# Patient Record
Sex: FEMALE | Race: OTHER | NOT HISPANIC OR LATINO
[De-identification: names, ages, dates, MRNs, and addresses within clinical notes are randomized per-mention and may not be internally consistent; named-entity substitution may affect disease eponyms.]

---

## 2018-11-14 ENCOUNTER — RESULT REVIEW (OUTPATIENT)
Age: 68
End: 2018-11-14

## 2019-04-08 PROBLEM — Z00.00 ENCOUNTER FOR PREVENTIVE HEALTH EXAMINATION: Status: ACTIVE | Noted: 2019-04-08

## 2019-04-17 ENCOUNTER — APPOINTMENT (OUTPATIENT)
Dept: OBGYN | Facility: CLINIC | Age: 69
End: 2019-04-17
Payer: MEDICARE

## 2019-04-17 VITALS
DIASTOLIC BLOOD PRESSURE: 70 MMHG | HEIGHT: 61 IN | WEIGHT: 138 LBS | SYSTOLIC BLOOD PRESSURE: 130 MMHG | BODY MASS INDEX: 26.06 KG/M2

## 2019-04-17 DIAGNOSIS — M54.16 RADICULOPATHY, LUMBAR REGION: ICD-10-CM

## 2019-04-17 DIAGNOSIS — D25.9 LEIOMYOMA OF UTERUS, UNSPECIFIED: ICD-10-CM

## 2019-04-17 DIAGNOSIS — K52.9 NONINFECTIVE GASTROENTERITIS AND COLITIS, UNSPECIFIED: ICD-10-CM

## 2019-04-17 DIAGNOSIS — M79.7 FIBROMYALGIA: ICD-10-CM

## 2019-04-17 DIAGNOSIS — R32 UNSPECIFIED URINARY INCONTINENCE: ICD-10-CM

## 2019-04-17 DIAGNOSIS — N83.209 UNSPECIFIED OVARIAN CYST, UNSPECIFIED SIDE: ICD-10-CM

## 2019-04-17 DIAGNOSIS — K21.9 GASTRO-ESOPHAGEAL REFLUX DISEASE W/OUT ESOPHAGITIS: ICD-10-CM

## 2019-04-17 PROCEDURE — 99205 OFFICE O/P NEW HI 60 MIN: CPT

## 2019-04-17 RX ORDER — PRAVASTATIN SODIUM 20 MG/1
20 TABLET ORAL
Refills: 0 | Status: ACTIVE | COMMUNITY

## 2019-04-17 RX ORDER — OMEPRAZOLE 100 %
POWDER (GRAM) MISCELLANEOUS
Refills: 0 | Status: ACTIVE | COMMUNITY

## 2019-04-17 RX ORDER — METFORMIN HYDROCHLORIDE 500 MG/1
500 TABLET, COATED ORAL
Refills: 0 | Status: ACTIVE | COMMUNITY

## 2019-04-17 NOTE — PHYSICAL EXAM
[Awake] : awake [Alert] : alert [Acute Distress] : no acute distress [LAD] : no lymphadenopathy [Thyroid Nodule] : no thyroid nodule [Goiter] : no goiter [Mass] : no breast mass [Nipple Discharge] : no nipple discharge [Axillary LAD] : no axillary lymphadenopathy [Tender] : non tender [Soft] : soft [Distended] : not distended [Oriented x3] : oriented to person, place, and time [H/Smegaly] : no hepatosplenomegaly [Depressed Mood] : not depressed [Flat Affect] : affect not flat [Normal] : uterus [No Bleeding] : there was no active vaginal bleeding [RRR, No Murmurs] : RRR, no murmurs [Uterine Adnexae] : were not tender and not enlarged [CTAB] : CTAB

## 2019-04-17 NOTE — COUNSELING
[Breast Self Exam] : breast self exam [Nutrition] : nutrition [Vitamins/Supplements] : vitamins/supplements [Exercise] : exercise [STD (testing, results, tx)] : STD (testing, results, tx) [Pre/Post Op Instructions] : pre/post op instructions [Weight Management] : weight management

## 2019-05-15 ENCOUNTER — APPOINTMENT (OUTPATIENT)
Dept: OBGYN | Facility: CLINIC | Age: 69
End: 2019-05-15

## 2019-06-13 ENCOUNTER — OUTPATIENT (OUTPATIENT)
Dept: OUTPATIENT SERVICES | Facility: HOSPITAL | Age: 69
LOS: 1 days | End: 2019-06-13
Payer: MEDICARE

## 2019-06-13 VITALS
WEIGHT: 136.03 LBS | RESPIRATION RATE: 16 BRPM | HEIGHT: 61 IN | OXYGEN SATURATION: 98 % | SYSTOLIC BLOOD PRESSURE: 144 MMHG | HEART RATE: 75 BPM | DIASTOLIC BLOOD PRESSURE: 75 MMHG | TEMPERATURE: 97 F

## 2019-06-13 DIAGNOSIS — N60.09 SOLITARY CYST OF UNSPECIFIED BREAST: Chronic | ICD-10-CM

## 2019-06-13 DIAGNOSIS — Z87.19 PERSONAL HISTORY OF OTHER DISEASES OF THE DIGESTIVE SYSTEM: ICD-10-CM

## 2019-06-13 DIAGNOSIS — N83.299 OTHER OVARIAN CYST, UNSPECIFIED SIDE: ICD-10-CM

## 2019-06-13 DIAGNOSIS — N83.209 UNSPECIFIED OVARIAN CYST, UNSPECIFIED SIDE: ICD-10-CM

## 2019-06-13 DIAGNOSIS — Z98.51 TUBAL LIGATION STATUS: Chronic | ICD-10-CM

## 2019-06-13 DIAGNOSIS — Z01.818 ENCOUNTER FOR OTHER PREPROCEDURAL EXAMINATION: ICD-10-CM

## 2019-06-13 LAB — BLD GP AB SCN SERPL QL: SIGNIFICANT CHANGE UP

## 2019-06-13 PROCEDURE — 86850 RBC ANTIBODY SCREEN: CPT

## 2019-06-13 PROCEDURE — G0463: CPT

## 2019-06-13 PROCEDURE — 86901 BLOOD TYPING SEROLOGIC RH(D): CPT

## 2019-06-13 PROCEDURE — 86900 BLOOD TYPING SEROLOGIC ABO: CPT

## 2019-06-13 PROCEDURE — 36415 COLL VENOUS BLD VENIPUNCTURE: CPT

## 2019-06-13 NOTE — H&P PST ADULT - REASON FOR ADMISSION
I have 1 cyst in the left ovary. That was an incidental finding in an MRI not related to my ovary, as per patient.  Patient states she has occasional pain in the left abdomen

## 2019-06-13 NOTE — H&P PST ADULT - NSANTHOSAYNRD_GEN_A_CORE
No. LORY screening performed.  STOP BANG Legend: 0-2 = LOW Risk; 3-4 = INTERMEDIATE Risk; 5-8 = HIGH Risk

## 2019-06-13 NOTE — H&P PST ADULT - HISTORY OF PRESENT ILLNESS
67 yo female with history of DM, HLD, Vitamin D deficiency, GERD, reports the above. Patient denies vaginal bleeding

## 2019-06-13 NOTE — H&P PST ADULT - NSICDXPROBLEM_GEN_ALL_CORE_FT
PROBLEM DIAGNOSES  Problem: Unspecified ovarian cyst, unspecified side  Assessment and Plan: Laparoscopic Left Oophorectomy    Problem: H/O gastroesophageal reflux (GERD)  Assessment and Plan: Advised to take medication daily, including the day of the procedure

## 2019-07-01 ENCOUNTER — TRANSCRIPTION ENCOUNTER (OUTPATIENT)
Age: 69
End: 2019-07-01

## 2019-07-02 ENCOUNTER — OUTPATIENT (OUTPATIENT)
Dept: OUTPATIENT SERVICES | Facility: HOSPITAL | Age: 69
LOS: 1 days | End: 2019-07-02
Payer: MEDICARE

## 2019-07-02 ENCOUNTER — RESULT REVIEW (OUTPATIENT)
Age: 69
End: 2019-07-02

## 2019-07-02 VITALS
RESPIRATION RATE: 14 BRPM | TEMPERATURE: 98 F | HEART RATE: 79 BPM | WEIGHT: 136.03 LBS | OXYGEN SATURATION: 97 % | SYSTOLIC BLOOD PRESSURE: 132 MMHG | DIASTOLIC BLOOD PRESSURE: 67 MMHG | HEIGHT: 61 IN

## 2019-07-02 VITALS
DIASTOLIC BLOOD PRESSURE: 68 MMHG | RESPIRATION RATE: 14 BRPM | TEMPERATURE: 98 F | SYSTOLIC BLOOD PRESSURE: 126 MMHG | OXYGEN SATURATION: 97 % | HEART RATE: 63 BPM

## 2019-07-02 DIAGNOSIS — N60.09 SOLITARY CYST OF UNSPECIFIED BREAST: Chronic | ICD-10-CM

## 2019-07-02 DIAGNOSIS — Z98.51 TUBAL LIGATION STATUS: Chronic | ICD-10-CM

## 2019-07-02 DIAGNOSIS — Z01.818 ENCOUNTER FOR OTHER PREPROCEDURAL EXAMINATION: ICD-10-CM

## 2019-07-02 DIAGNOSIS — N83.209 UNSPECIFIED OVARIAN CYST, UNSPECIFIED SIDE: ICD-10-CM

## 2019-07-02 LAB — BLD GP AB SCN SERPL QL: SIGNIFICANT CHANGE UP

## 2019-07-02 PROCEDURE — 88307 TISSUE EXAM BY PATHOLOGIST: CPT

## 2019-07-02 PROCEDURE — 58661 LAPAROSCOPY REMOVE ADNEXA: CPT

## 2019-07-02 PROCEDURE — 86900 BLOOD TYPING SEROLOGIC ABO: CPT

## 2019-07-02 PROCEDURE — 86850 RBC ANTIBODY SCREEN: CPT

## 2019-07-02 PROCEDURE — 58661 LAPAROSCOPY REMOVE ADNEXA: CPT | Mod: AS

## 2019-07-02 PROCEDURE — 82962 GLUCOSE BLOOD TEST: CPT

## 2019-07-02 PROCEDURE — 36415 COLL VENOUS BLD VENIPUNCTURE: CPT

## 2019-07-02 PROCEDURE — 86901 BLOOD TYPING SEROLOGIC RH(D): CPT

## 2019-07-02 RX ORDER — ACETAMINOPHEN 500 MG
2 TABLET ORAL
Qty: 40 | Refills: 0
Start: 2019-07-02 | End: 2019-07-06

## 2019-07-02 RX ORDER — SODIUM CHLORIDE 9 MG/ML
1000 INJECTION, SOLUTION INTRAVENOUS
Refills: 0 | Status: DISCONTINUED | OUTPATIENT
Start: 2019-07-02 | End: 2019-07-02

## 2019-07-02 RX ORDER — HYDROMORPHONE HYDROCHLORIDE 2 MG/ML
0.5 INJECTION INTRAMUSCULAR; INTRAVENOUS; SUBCUTANEOUS
Refills: 0 | Status: DISCONTINUED | OUTPATIENT
Start: 2019-07-02 | End: 2019-07-02

## 2019-07-02 RX ORDER — ONDANSETRON 8 MG/1
4 TABLET, FILM COATED ORAL ONCE
Refills: 0 | Status: DISCONTINUED | OUTPATIENT
Start: 2019-07-02 | End: 2019-07-02

## 2019-07-02 RX ORDER — ACETAMINOPHEN 500 MG
650 TABLET ORAL EVERY 6 HOURS
Refills: 0 | Status: DISCONTINUED | OUTPATIENT
Start: 2019-07-02 | End: 2019-07-10

## 2019-07-02 RX ORDER — MOXIFLOXACIN HYDROCHLORIDE TABLETS, 400 MG 400 MG/1
1 TABLET, FILM COATED ORAL
Qty: 6 | Refills: 0
Start: 2019-07-02 | End: 2019-07-04

## 2019-07-02 RX ORDER — SODIUM CHLORIDE 9 MG/ML
3 INJECTION INTRAMUSCULAR; INTRAVENOUS; SUBCUTANEOUS EVERY 8 HOURS
Refills: 0 | Status: DISCONTINUED | OUTPATIENT
Start: 2019-07-02 | End: 2019-07-02

## 2019-07-02 RX ORDER — ACETAMINOPHEN 500 MG
1000 TABLET ORAL ONCE
Refills: 0 | Status: COMPLETED | OUTPATIENT
Start: 2019-07-02 | End: 2019-07-02

## 2019-07-02 RX ADMIN — Medication 400 MILLIGRAM(S): at 09:57

## 2019-07-02 RX ADMIN — HYDROMORPHONE HYDROCHLORIDE 0.5 MILLIGRAM(S): 2 INJECTION INTRAMUSCULAR; INTRAVENOUS; SUBCUTANEOUS at 10:20

## 2019-07-02 RX ADMIN — Medication 1000 MILLIGRAM(S): at 10:20

## 2019-07-02 RX ADMIN — HYDROMORPHONE HYDROCHLORIDE 0.5 MILLIGRAM(S): 2 INJECTION INTRAMUSCULAR; INTRAVENOUS; SUBCUTANEOUS at 09:56

## 2019-07-02 NOTE — ASU PREOP CHECKLIST - STERILIZATION AFFIRMATION
FORM: EXAMINATION FOR HOUSEBOUND STATUS OR PERMANENT NEED FOR REGULAR AID AND ATTENDANCE THROUGH [facility]    Left By: Christine menezes    Instructions: Please complete and call Guera Ivan when ready for     To Be Returned By: Next week    For Further Information, the patient may be contacted at: Please contact Guera Ivan with any questions. This will be given to Dr. Juarez Chung  nurse. n/a

## 2019-07-02 NOTE — ASU DISCHARGE PLAN (ADULT/PEDIATRIC) - CARE PROVIDER_API CALL
Timbo Serrato)  Obstetrics and Gynecology  [address] [facility], [place], [place] [address]  Phone: [phone]  Fax: [phone]  Follow Up Time:

## 2019-07-02 NOTE — ASU DISCHARGE PLAN (ADULT/PEDIATRIC) - ACTIVITY LEVEL
No tampons/Nothing per vagina/No tub baths/No intercourse/No douching Nothing per rectum/Nothing per vagina/No tub baths/No tampons/No douching/No intercourse/No heavy lifting

## 2019-07-02 NOTE — ASU DISCHARGE PLAN (ADULT/PEDIATRIC) - NURSING INSTRUCTIONS
Keep pad in place until spotting stops. Change pads frequently as needed.  Keep dressing dry, clean, intact for 2 days. Do not get incision wet for 48 hours (2 days). After 2 days, remove dressing (clear tape and gauze) and leave steri strips (white strips) on. You can shower with steri strips on. The steri strips will fall off during shower. Do not rub the steri strips off. Pat dry after shower. It sometimes take 2-3 days for them to fall off.

## 2019-07-02 NOTE — ASU DISCHARGE PLAN (ADULT/PEDIATRIC) - ASU DC SPECIAL INSTRUCTIONSFT
Follow up with Dr. Serrato in 2 weeks  Take ibuprofen as needed for pain  Cipro for infection prophylaxis post procedure  Any change or fever, chills, surgical site redness, worsening of pain, return to the hospital for evaluation and contact your physician. Follow up with Dr. Serrato in 2 weeks  [facility]  Any change or fever, chills, surgical site redness, worsening of pain, return to the hospital for evaluation and contact your physician.

## 2019-07-02 NOTE — ASU DISCHARGE PLAN (ADULT/PEDIATRIC) - CALL YOUR DOCTOR IF YOU HAVE ANY OF THE FOLLOWING:
Wound/Surgical Site with redness, or foul smelling discharge or pus/Pain not relieved by Medications/Unable to urinate/Swelling that gets worse/Fever greater than (need to indicate Fahrenheit or Celsius)/Bleeding that does not stop

## 2019-07-03 PROBLEM — Z87.19 PERSONAL HISTORY OF OTHER DISEASES OF THE DIGESTIVE SYSTEM: Chronic | Status: ACTIVE | Noted: 2019-06-13

## 2019-07-03 PROBLEM — E78.5 HYPERLIPIDEMIA, UNSPECIFIED: Chronic | Status: ACTIVE | Noted: 2019-06-13

## 2019-07-03 LAB — BLD GP AB SCN SERPL QL: SIGNIFICANT CHANGE UP

## 2019-07-17 ENCOUNTER — APPOINTMENT (OUTPATIENT)
Dept: OBGYN | Facility: CLINIC | Age: 69
End: 2019-07-17
Payer: MEDICARE

## 2019-07-17 ENCOUNTER — EMERGENCY (EMERGENCY)
Facility: HOSPITAL | Age: 69
LOS: 1 days | Discharge: HOME | End: 2019-07-17
Attending: EMERGENCY MEDICINE
Payer: MEDICARE

## 2019-07-17 VITALS
HEIGHT: 59 IN | RESPIRATION RATE: 16 BRPM | OXYGEN SATURATION: 99 % | HEART RATE: 92 BPM | DIASTOLIC BLOOD PRESSURE: 78 MMHG | TEMPERATURE: 99 F | SYSTOLIC BLOOD PRESSURE: 164 MMHG | WEIGHT: 134.92 LBS

## 2019-07-17 VITALS
RESPIRATION RATE: 16 BRPM | DIASTOLIC BLOOD PRESSURE: 63 MMHG | TEMPERATURE: 97 F | SYSTOLIC BLOOD PRESSURE: 158 MMHG | OXYGEN SATURATION: 98 % | HEART RATE: 91 BPM

## 2019-07-17 DIAGNOSIS — N60.09 SOLITARY CYST OF UNSPECIFIED BREAST: Chronic | ICD-10-CM

## 2019-07-17 DIAGNOSIS — Z98.51 TUBAL LIGATION STATUS: Chronic | ICD-10-CM

## 2019-07-17 DIAGNOSIS — R10.9 UNSPECIFIED ABDOMINAL PAIN: ICD-10-CM

## 2019-07-17 LAB
ACETONE SERPL-MCNC: NEGATIVE — SIGNIFICANT CHANGE UP
ALBUMIN SERPL ELPH-MCNC: 3.6 G/DL — SIGNIFICANT CHANGE UP (ref 3.5–5)
ALP SERPL-CCNC: 81 U/L — SIGNIFICANT CHANGE UP (ref 40–120)
ALT FLD-CCNC: 27 U/L DA — SIGNIFICANT CHANGE UP (ref 10–60)
ANION GAP SERPL CALC-SCNC: 8 MMOL/L — SIGNIFICANT CHANGE UP (ref 5–17)
APPEARANCE UR: CLEAR — SIGNIFICANT CHANGE UP
AST SERPL-CCNC: 12 U/L — SIGNIFICANT CHANGE UP (ref 10–40)
BASOPHILS # BLD AUTO: 0.07 K/UL — SIGNIFICANT CHANGE UP (ref 0–0.2)
BASOPHILS # BLD AUTO: 0.07 K/UL — SIGNIFICANT CHANGE UP (ref 0–0.2)
BASOPHILS NFR BLD AUTO: 0.8 % — SIGNIFICANT CHANGE UP (ref 0–2)
BASOPHILS NFR BLD AUTO: 0.8 % — SIGNIFICANT CHANGE UP (ref 0–2)
BILIRUB SERPL-MCNC: 0.2 MG/DL — SIGNIFICANT CHANGE UP (ref 0.2–1.2)
BILIRUB UR-MCNC: NEGATIVE — SIGNIFICANT CHANGE UP
BUN SERPL-MCNC: 9 MG/DL — SIGNIFICANT CHANGE UP (ref 7–18)
CALCIUM SERPL-MCNC: 9.1 MG/DL — SIGNIFICANT CHANGE UP (ref 8.4–10.5)
CHLORIDE SERPL-SCNC: 109 MMOL/L — HIGH (ref 96–108)
CO2 SERPL-SCNC: 25 MMOL/L — SIGNIFICANT CHANGE UP (ref 22–31)
COLOR SPEC: YELLOW — SIGNIFICANT CHANGE UP
CREAT SERPL-MCNC: 0.6 MG/DL — SIGNIFICANT CHANGE UP (ref 0.5–1.3)
DIFF PNL FLD: NEGATIVE — SIGNIFICANT CHANGE UP
EOSINOPHIL # BLD AUTO: 0.24 K/UL — SIGNIFICANT CHANGE UP (ref 0–0.5)
EOSINOPHIL # BLD AUTO: 0.25 K/UL — SIGNIFICANT CHANGE UP (ref 0–0.5)
EOSINOPHIL NFR BLD AUTO: 2.7 % — SIGNIFICANT CHANGE UP (ref 0–6)
EOSINOPHIL NFR BLD AUTO: 2.8 % — SIGNIFICANT CHANGE UP (ref 0–6)
GLUCOSE SERPL-MCNC: 170 MG/DL — HIGH (ref 70–99)
GLUCOSE UR QL: NEGATIVE — SIGNIFICANT CHANGE UP
HCT VFR BLD CALC: 43 % — SIGNIFICANT CHANGE UP (ref 34.5–45)
HCT VFR BLD CALC: 44.4 % — SIGNIFICANT CHANGE UP (ref 34.5–45)
HGB BLD-MCNC: 14.2 G/DL — SIGNIFICANT CHANGE UP (ref 11.5–15.5)
HGB BLD-MCNC: 14.7 G/DL — SIGNIFICANT CHANGE UP (ref 11.5–15.5)
IMM GRANULOCYTES NFR BLD AUTO: 0.8 % — SIGNIFICANT CHANGE UP (ref 0–1.5)
IMM GRANULOCYTES NFR BLD AUTO: 0.9 % — SIGNIFICANT CHANGE UP (ref 0–1.5)
KETONES UR-MCNC: NEGATIVE — SIGNIFICANT CHANGE UP
LEUKOCYTE ESTERASE UR-ACNC: NEGATIVE — SIGNIFICANT CHANGE UP
LYMPHOCYTES # BLD AUTO: 1.5 K/UL — SIGNIFICANT CHANGE UP (ref 1–3.3)
LYMPHOCYTES # BLD AUTO: 1.62 K/UL — SIGNIFICANT CHANGE UP (ref 1–3.3)
LYMPHOCYTES # BLD AUTO: 17.3 % — SIGNIFICANT CHANGE UP (ref 13–44)
LYMPHOCYTES # BLD AUTO: 17.7 % — SIGNIFICANT CHANGE UP (ref 13–44)
MAGNESIUM SERPL-MCNC: 2.1 MG/DL — SIGNIFICANT CHANGE UP (ref 1.6–2.6)
MCHC RBC-ENTMCNC: 26.7 PG — LOW (ref 27–34)
MCHC RBC-ENTMCNC: 26.8 PG — LOW (ref 27–34)
MCHC RBC-ENTMCNC: 33 GM/DL — SIGNIFICANT CHANGE UP (ref 32–36)
MCHC RBC-ENTMCNC: 33.1 GM/DL — SIGNIFICANT CHANGE UP (ref 32–36)
MCV RBC AUTO: 81 FL — SIGNIFICANT CHANGE UP (ref 80–100)
MCV RBC AUTO: 81 FL — SIGNIFICANT CHANGE UP (ref 80–100)
MONOCYTES # BLD AUTO: 0.55 K/UL — SIGNIFICANT CHANGE UP (ref 0–0.9)
MONOCYTES # BLD AUTO: 0.55 K/UL — SIGNIFICANT CHANGE UP (ref 0–0.9)
MONOCYTES NFR BLD AUTO: 6 % — SIGNIFICANT CHANGE UP (ref 2–14)
MONOCYTES NFR BLD AUTO: 6.3 % — SIGNIFICANT CHANGE UP (ref 2–14)
NEUTROPHILS # BLD AUTO: 6.24 K/UL — SIGNIFICANT CHANGE UP (ref 1.8–7.4)
NEUTROPHILS # BLD AUTO: 6.57 K/UL — SIGNIFICANT CHANGE UP (ref 1.8–7.4)
NEUTROPHILS NFR BLD AUTO: 71.9 % — SIGNIFICANT CHANGE UP (ref 43–77)
NEUTROPHILS NFR BLD AUTO: 72 % — SIGNIFICANT CHANGE UP (ref 43–77)
NITRITE UR-MCNC: NEGATIVE — SIGNIFICANT CHANGE UP
NRBC # BLD: 0 /100 WBCS — SIGNIFICANT CHANGE UP (ref 0–0)
NRBC # BLD: 0 /100 WBCS — SIGNIFICANT CHANGE UP (ref 0–0)
PH UR: 5 — SIGNIFICANT CHANGE UP (ref 5–8)
PLATELET # BLD AUTO: 109 K/UL — LOW (ref 150–400)
PLATELET # BLD AUTO: 91 K/UL — LOW (ref 150–400)
POTASSIUM SERPL-MCNC: 3.7 MMOL/L — SIGNIFICANT CHANGE UP (ref 3.5–5.3)
POTASSIUM SERPL-SCNC: 3.7 MMOL/L — SIGNIFICANT CHANGE UP (ref 3.5–5.3)
PROT SERPL-MCNC: 7.8 G/DL — SIGNIFICANT CHANGE UP (ref 6–8.3)
PROT UR-MCNC: NEGATIVE — SIGNIFICANT CHANGE UP
RBC # BLD: 5.31 M/UL — HIGH (ref 3.8–5.2)
RBC # BLD: 5.48 M/UL — HIGH (ref 3.8–5.2)
RBC # FLD: 13.1 % — SIGNIFICANT CHANGE UP (ref 10.3–14.5)
RBC # FLD: 13.1 % — SIGNIFICANT CHANGE UP (ref 10.3–14.5)
SODIUM SERPL-SCNC: 142 MMOL/L — SIGNIFICANT CHANGE UP (ref 135–145)
SP GR SPEC: 1.01 — SIGNIFICANT CHANGE UP (ref 1.01–1.02)
UROBILINOGEN FLD QL: NEGATIVE — SIGNIFICANT CHANGE UP
WBC # BLD: 8.68 K/UL — SIGNIFICANT CHANGE UP (ref 3.8–10.5)
WBC # BLD: 9.13 K/UL — SIGNIFICANT CHANGE UP (ref 3.8–10.5)
WBC # FLD AUTO: 8.68 K/UL — SIGNIFICANT CHANGE UP (ref 3.8–10.5)
WBC # FLD AUTO: 9.13 K/UL — SIGNIFICANT CHANGE UP (ref 3.8–10.5)

## 2019-07-17 PROCEDURE — 81003 URINALYSIS AUTO W/O SCOPE: CPT

## 2019-07-17 PROCEDURE — 80053 COMPREHEN METABOLIC PANEL: CPT

## 2019-07-17 PROCEDURE — 74177 CT ABD & PELVIS W/CONTRAST: CPT

## 2019-07-17 PROCEDURE — 99285 EMERGENCY DEPT VISIT HI MDM: CPT

## 2019-07-17 PROCEDURE — 96375 TX/PRO/DX INJ NEW DRUG ADDON: CPT

## 2019-07-17 PROCEDURE — 99284 EMERGENCY DEPT VISIT MOD MDM: CPT | Mod: 25

## 2019-07-17 PROCEDURE — 96374 THER/PROPH/DIAG INJ IV PUSH: CPT | Mod: XU

## 2019-07-17 PROCEDURE — 85027 COMPLETE CBC AUTOMATED: CPT

## 2019-07-17 PROCEDURE — 36415 COLL VENOUS BLD VENIPUNCTURE: CPT

## 2019-07-17 PROCEDURE — 74177 CT ABD & PELVIS W/CONTRAST: CPT | Mod: 26

## 2019-07-17 PROCEDURE — 83735 ASSAY OF MAGNESIUM: CPT

## 2019-07-17 PROCEDURE — 87086 URINE CULTURE/COLONY COUNT: CPT

## 2019-07-17 PROCEDURE — 82009 KETONE BODYS QUAL: CPT

## 2019-07-17 RX ORDER — KETOROLAC TROMETHAMINE 30 MG/ML
30 SYRINGE (ML) INJECTION ONCE
Refills: 0 | Status: DISCONTINUED | OUTPATIENT
Start: 2019-07-17 | End: 2019-07-17

## 2019-07-17 RX ORDER — IBUPROFEN 200 MG
1 TABLET ORAL
Qty: 21 | Refills: 0
Start: 2019-07-17 | End: 2019-07-23

## 2019-07-17 RX ORDER — SODIUM CHLORIDE 9 MG/ML
1000 INJECTION INTRAMUSCULAR; INTRAVENOUS; SUBCUTANEOUS
Refills: 0 | Status: DISCONTINUED | OUTPATIENT
Start: 2019-07-17 | End: 2019-07-21

## 2019-07-17 RX ORDER — ONDANSETRON 8 MG/1
4 TABLET, FILM COATED ORAL ONCE
Refills: 0 | Status: COMPLETED | OUTPATIENT
Start: 2019-07-17 | End: 2019-07-17

## 2019-07-17 RX ORDER — IOHEXOL 300 MG/ML
30 INJECTION, SOLUTION INTRAVENOUS ONCE
Refills: 0 | Status: COMPLETED | OUTPATIENT
Start: 2019-07-17 | End: 2019-07-17

## 2019-07-17 RX ORDER — MORPHINE SULFATE 50 MG/1
4 CAPSULE, EXTENDED RELEASE ORAL ONCE
Refills: 0 | Status: DISCONTINUED | OUTPATIENT
Start: 2019-07-17 | End: 2019-07-17

## 2019-07-17 RX ADMIN — Medication 30 MILLIGRAM(S): at 18:40

## 2019-07-17 RX ADMIN — MORPHINE SULFATE 4 MILLIGRAM(S): 50 CAPSULE, EXTENDED RELEASE ORAL at 19:21

## 2019-07-17 RX ADMIN — Medication 30 MILLIGRAM(S): at 17:10

## 2019-07-17 RX ADMIN — IOHEXOL 30 MILLILITER(S): 300 INJECTION, SOLUTION INTRAVENOUS at 15:20

## 2019-07-17 RX ADMIN — ONDANSETRON 4 MILLIGRAM(S): 8 TABLET, FILM COATED ORAL at 19:21

## 2019-07-17 RX ADMIN — SODIUM CHLORIDE 125 MILLILITER(S): 9 INJECTION INTRAMUSCULAR; INTRAVENOUS; SUBCUTANEOUS at 15:21

## 2019-07-17 NOTE — CONSULT NOTE ADULT - ASSESSMENT
a/p: 67yo F POD#15 s/p laparoscopic left salpingo-oophorectomy with LLQ abdominal pain. CT scan negative for acute pathology.

## 2019-07-17 NOTE — ED PROVIDER NOTE - OBJECTIVE STATEMENT
69 y/o female with PMHx of DM, HLD, acid reflux presents to the ED c/o L lower abd pain x 3 days. Pt notes she underwent a laparoscopic minna salpingo-oophorectomy on 7/2 and was d/c home the same day. Pt notes intermittent L lower abd pain, worse with walking and positional changes, associated with nausea and burning urination. Pt notes pain is 10/10. Pt took Tylenol to minimal relief. Pt with nml appetite. Last BM x this morning, non-bloody. Pt denies fever, vomiting, or any other complaints. Pt allergic to Penicillin and ASA (rash, bruising).

## 2019-07-17 NOTE — CONSULT NOTE ADULT - SUBJECTIVE AND OBJECTIVE BOX
HPI: 67yo F POD#15 s/p laparoscopic LT salpingo-oophorectomy presents to ED c/o LLQ pain. Patient states that she had some pain after the surgery 2 weeks ago however it was tolerable. She states that the last 3 days the pain worsened and she states it is now a 8/10. She has been taking OTC meds for pain management Denies chest pain, SOB, nausea, vaginal bleeding, surgical site redness. +Flatus and BM.    see ED note for full pmhx    REVIEW OF SYSTEMS: see HPI	    PE:  Vital Signs Last 24 Hrs  T(C): 36.3 (2019 15:46), Max: 37.1 (2019 14:11)  T(F): 97.4 (2019 15:46), Max: 98.7 (2019 14:11)  HR: 91 (2019 15:46) (91 - 92)  BP: 158/63 (2019 15:46) (158/63 - 164/78)  BP(mean): --  RR: 16 (2019 15:46) (16 - 16)  SpO2: 98% (2019 15:46) (98% - 99%)    gen: well appearing female in NAD  abd: +bs; soft, mild tenderness to deep palpation of LLQ, no rebound or guarding  pelvis: deferred     LABS:                        14.2   9.13  )-----------( 109      ( 2019 15:57 )             43.0     07-17    142  |  109<H>  |  9   ----------------------------<  170<H>  3.7   |  25  |  0.60    Ca    9.1      2019 15:57  Mg     2.1     17    TPro  7.8  /  Alb  3.6  /  TBili  0.2  /  DBili  x   /  AST  12  /  ALT  27  /  AlkPhos  81  07-17      Urinalysis Basic - ( 2019 15:17 )    Color: Yellow / Appearance: Clear / S.010 / pH: x  Gluc: x / Ketone: Negative  / Bili: Negative / Urobili: Negative   Blood: x / Protein: Negative / Nitrite: Negative   Leuk Esterase: Negative / RBC: x / WBC x   Sq Epi: x / Non Sq Epi: x / Bacteria: x        RADIOLOGY & ADDITIONAL STUDIES:  < from: CT Abdomen and Pelvis w/ Oral Cont and w/ IV Cont (19 @ 17:38) >  EXAM:  CT ABDOMEN AND PELVIS OC IC                            PROCEDURE DATE:  2019          INTERPRETATION:  CLINICAL STATEMENT: abd pain s/p surgery , status   post BSO, left lower quadrant pain    TECHNIQUE: CT of the abdomen and pelviswas performed with IV contrast.   Oral contrast was administered. Approximately 90 cc of Omnipaque 350   administered.    COMPARISON: None.    FINDINGS:    The lower chest is unremarkable.    The liver is unremarkable. The fluid-filled gallbladder isappreciated.    The spleen and adrenal glands are unremarkable.The kidneys are   unremarkable. The fluid-filled bladder appears intact. There are small   cystic foci in the pancreatic head, the larger measuring 1.2 cm on axial   image 36 of series 2 and the smaller measuring 4 mm on image 41.    There is no bowel obstruction. A normal appendix is seen. Appendix is   retrocecal and extends to the mid abdomen. There is a small   fat-containing umbilical hernia. There is mild infiltration in the   subcutaneous soft tissues of the left anterior abdominal wall which may   be due to the recent surgery.    There is no intraperitoneal free air.  There is no free fluid. The aorta   is not aneurysmal.    There is no significant abdominal, retroperitoneal or pelvic   lymphadenopathy. The pelvic structures are remarkable for fibroid uterus.    The osseous structures are unremarkable.    IMPRESSION:    No acute pathology.  2 small cysts in the pancreatic head/uncinate process should be followed   withrepeat CAT scan of the pancreas in 2 years.  Fibroid uterus

## 2019-07-17 NOTE — CONSULT NOTE ADULT - PROBLEM SELECTOR RECOMMENDATION 9
- Discharge home   - Ibuprofen as needed for pain  - Warm compresses to the area as needed  - Follow up with Dr. Serrato in the office tomorrow as scheduled  - If you develop fever, chills, worsening of pain or any other concerns, return to the ED for evaluation.  - D/w Dr. Serrato  - D/w Dr. Valdez, ED attending  - Patient and  understand and agree with plan

## 2019-07-17 NOTE — ED PROVIDER NOTE - PROGRESS NOTE DETAILS
CT A/P- no post surgical complications, pt with small Pancreatic head cyst.  Seen by gyn, no gyn pathology.  Advised to f/u with gyn, also PMD for her pancreatic cyst.

## 2019-07-18 ENCOUNTER — APPOINTMENT (OUTPATIENT)
Dept: OBGYN | Facility: CLINIC | Age: 69
End: 2019-07-18
Payer: MEDICARE

## 2019-07-18 VITALS — DIASTOLIC BLOOD PRESSURE: 70 MMHG | BODY MASS INDEX: 26.08 KG/M2 | WEIGHT: 138 LBS | SYSTOLIC BLOOD PRESSURE: 125 MMHG

## 2019-07-18 LAB
CULTURE RESULTS: SIGNIFICANT CHANGE UP
SPECIMEN SOURCE: SIGNIFICANT CHANGE UP

## 2019-07-18 PROCEDURE — 99024 POSTOP FOLLOW-UP VISIT: CPT

## 2019-07-18 NOTE — HISTORY OF PRESENT ILLNESS
[0/10] : no pain reported [Fever] : no fever [Chills] : no chills [Nausea] : no nausea [Vomiting] : no vomiting [Clean/Dry/Intact] : clean, dry and intact [Erythema] : not erythematous [Normal] : the vagina was normal [Doing Well] : is doing well [Excellent Pain Control] : has excellent pain control [No Sign of Infection] : is showing no signs of infection [None] : None [de-identified] : pt comes for f/u was seen in the er yesterday for pain left side . Work up included labs and CT , ultrasounds all found to be normal .  [de-identified] : s/p laparoscopic oophorectomy .  [de-identified] : rto for pap .

## 2021-06-15 ENCOUNTER — RESULT REVIEW (OUTPATIENT)
Age: 71
End: 2021-06-15

## 2021-07-12 ENCOUNTER — RESULT REVIEW (OUTPATIENT)
Age: 71
End: 2021-07-12

## 2021-07-14 ENCOUNTER — OUTPATIENT (OUTPATIENT)
Dept: OUTPATIENT SERVICES | Facility: HOSPITAL | Age: 71
LOS: 1 days | End: 2021-07-14
Payer: MEDICARE

## 2021-07-14 VITALS
DIASTOLIC BLOOD PRESSURE: 86 MMHG | WEIGHT: 134.92 LBS | HEART RATE: 76 BPM | SYSTOLIC BLOOD PRESSURE: 146 MMHG | RESPIRATION RATE: 14 BRPM | TEMPERATURE: 98 F | HEIGHT: 60 IN | OXYGEN SATURATION: 98 %

## 2021-07-14 DIAGNOSIS — C80.1 MALIGNANT (PRIMARY) NEOPLASM, UNSPECIFIED: ICD-10-CM

## 2021-07-14 DIAGNOSIS — C50.811 MALIGNANT NEOPLASM OF OVERLAPPING SITES OF RIGHT FEMALE BREAST: ICD-10-CM

## 2021-07-14 DIAGNOSIS — N60.09 SOLITARY CYST OF UNSPECIFIED BREAST: Chronic | ICD-10-CM

## 2021-07-14 DIAGNOSIS — Z98.51 TUBAL LIGATION STATUS: Chronic | ICD-10-CM

## 2021-07-14 LAB
A1C WITH ESTIMATED AVERAGE GLUCOSE RESULT: 6.4 % — HIGH (ref 4–5.6)
ANION GAP SERPL CALC-SCNC: 14 MMOL/L — SIGNIFICANT CHANGE UP (ref 7–14)
BUN SERPL-MCNC: 9 MG/DL — SIGNIFICANT CHANGE UP (ref 7–23)
CALCIUM SERPL-MCNC: 9.4 MG/DL — SIGNIFICANT CHANGE UP (ref 8.4–10.5)
CHLORIDE SERPL-SCNC: 105 MMOL/L — SIGNIFICANT CHANGE UP (ref 98–107)
CO2 SERPL-SCNC: 21 MMOL/L — LOW (ref 22–31)
CREAT SERPL-MCNC: 0.47 MG/DL — LOW (ref 0.5–1.3)
ESTIMATED AVERAGE GLUCOSE: 137 — SIGNIFICANT CHANGE UP
GLUCOSE SERPL-MCNC: 125 MG/DL — HIGH (ref 70–99)
HCT VFR BLD CALC: 44.6 % — SIGNIFICANT CHANGE UP (ref 34.5–45)
HGB BLD-MCNC: 14.2 G/DL — SIGNIFICANT CHANGE UP (ref 11.5–15.5)
MCHC RBC-ENTMCNC: 26 PG — LOW (ref 27–34)
MCHC RBC-ENTMCNC: 31.8 GM/DL — LOW (ref 32–36)
MCV RBC AUTO: 81.5 FL — SIGNIFICANT CHANGE UP (ref 80–100)
NRBC # BLD: 0 /100 WBCS — SIGNIFICANT CHANGE UP
NRBC # FLD: 0 K/UL — SIGNIFICANT CHANGE UP
PLATELET # BLD AUTO: 43 K/UL — LOW (ref 150–400)
POTASSIUM SERPL-MCNC: 3.9 MMOL/L — SIGNIFICANT CHANGE UP (ref 3.5–5.3)
POTASSIUM SERPL-SCNC: 3.9 MMOL/L — SIGNIFICANT CHANGE UP (ref 3.5–5.3)
RBC # BLD: 5.47 M/UL — HIGH (ref 3.8–5.2)
RBC # FLD: 13.6 % — SIGNIFICANT CHANGE UP (ref 10.3–14.5)
SODIUM SERPL-SCNC: 140 MMOL/L — SIGNIFICANT CHANGE UP (ref 135–145)
WBC # BLD: 6.89 K/UL — SIGNIFICANT CHANGE UP (ref 3.8–10.5)
WBC # FLD AUTO: 6.89 K/UL — SIGNIFICANT CHANGE UP (ref 3.8–10.5)

## 2021-07-14 PROCEDURE — 93010 ELECTROCARDIOGRAM REPORT: CPT

## 2021-07-14 RX ORDER — OMEGA-3 ACID ETHYL ESTERS 1 G
1 CAPSULE ORAL
Qty: 0 | Refills: 0 | DISCHARGE

## 2021-07-14 RX ORDER — OMEPRAZOLE 10 MG/1
1 CAPSULE, DELAYED RELEASE ORAL
Qty: 0 | Refills: 0 | DISCHARGE

## 2021-07-14 RX ORDER — CHOLECALCIFEROL (VITAMIN D3) 125 MCG
1 CAPSULE ORAL
Qty: 0 | Refills: 0 | DISCHARGE

## 2021-07-14 RX ORDER — METFORMIN HYDROCHLORIDE 850 MG/1
1 TABLET ORAL
Qty: 0 | Refills: 0 | DISCHARGE

## 2021-07-14 RX ORDER — UBIDECARENONE 100 MG
1 CAPSULE ORAL
Qty: 0 | Refills: 0 | DISCHARGE

## 2021-07-14 RX ORDER — SODIUM CHLORIDE 9 MG/ML
1000 INJECTION, SOLUTION INTRAVENOUS
Refills: 0 | Status: DISCONTINUED | OUTPATIENT
Start: 2021-07-21 | End: 2021-08-05

## 2021-07-14 NOTE — H&P PST ADULT - HISTORY OF PRESENT ILLNESS
69y/o female scheduled for lumpectomy right breast with 2X localization, right sentinal node injection possible biopsy with axillary dissection on 7/21/2021.  Pt states, "went for routine mammogram, US identified abnormality in right breast.  F/u MRI and bx- lobular carcinoma in situ."

## 2021-07-14 NOTE — H&P PST ADULT - NSICDXPASTMEDICALHX_GEN_ALL_CORE_FT
PAST MEDICAL HISTORY:  Diabetes type 2    H/O gastroesophageal reflux (GERD)     Hyperlipidemia     Malignant neoplasm breast    Thrombocytopenia

## 2021-07-14 NOTE — H&P PST ADULT - CARDIOVASCULAR COMMENTS
for the past 6 months has palpitations not associated with activity, last episode 1 week ago was evaluated by cardiology 5/2021 for the past 6 months has palpitations not associated with activity,  denies worsening of symptoms last episode 1 week ago was evaluated by cardiology 5/2021

## 2021-07-14 NOTE — H&P PST ADULT - NSICDXPASTSURGICALHX_GEN_ALL_CORE_FT
PAST SURGICAL HISTORY:  Breast cyst removed bilateral      delivery delivered ,     H/O tubal ligation

## 2021-07-14 NOTE — H&P PST ADULT - VENOUS THROMBOEMBOLISM CURRENT STATUS
(0) indicator not present (1) other risk factor (includes escalating BMI, pack-years of smoking, diabetes requiring insulin, chemotherapy, female gender and length of surgery)/(2) malignancy (present or previous)

## 2021-07-14 NOTE — H&P PST ADULT - NSICDXPROBLEM_GEN_ALL_CORE_FT
PROBLEM DIAGNOSES  Problem: Malignant neoplasm  Assessment and Plan: Pt scheduled for lumpectomy right breast with 2 X localization, right sentinal node injection possible biopsy with axillary dissection on 7/21/2021.  labs done results pending, ekg done.  Hibiclens provided-  written and verbal instructions given with teach back, pt able to verbalize understanding.  Preop teaching done, pt able to verbalize understanding.   dm-  pt instructed not to take dm medications day of procedure,last dose of metformin 7/20/2021 morning dose  mediacation day of procedure- pepcid   OR booking notified of dm

## 2021-07-15 ENCOUNTER — OUTPATIENT (OUTPATIENT)
Dept: OUTPATIENT SERVICES | Facility: HOSPITAL | Age: 71
LOS: 1 days | End: 2021-07-15

## 2021-07-15 DIAGNOSIS — Z98.51 TUBAL LIGATION STATUS: Chronic | ICD-10-CM

## 2021-07-15 DIAGNOSIS — C50.811 MALIGNANT NEOPLASM OF OVERLAPPING SITES OF RIGHT FEMALE BREAST: ICD-10-CM

## 2021-07-15 DIAGNOSIS — N60.09 SOLITARY CYST OF UNSPECIFIED BREAST: Chronic | ICD-10-CM

## 2021-07-15 PROBLEM — C80.1 MALIGNANT (PRIMARY) NEOPLASM, UNSPECIFIED: Chronic | Status: ACTIVE | Noted: 2021-07-14

## 2021-07-15 PROBLEM — D69.6 THROMBOCYTOPENIA, UNSPECIFIED: Chronic | Status: ACTIVE | Noted: 2021-07-14

## 2021-07-15 PROBLEM — E11.9 TYPE 2 DIABETES MELLITUS WITHOUT COMPLICATIONS: Chronic | Status: ACTIVE | Noted: 2019-06-13

## 2021-07-15 LAB — CLOSURE TME COLL+EPINEP BLD: 43 K/UL — LOW (ref 150–400)

## 2021-07-20 ENCOUNTER — APPOINTMENT (OUTPATIENT)
Dept: MAMMOGRAPHY | Facility: IMAGING CENTER | Age: 71
End: 2021-07-20
Payer: MEDICARE

## 2021-07-20 ENCOUNTER — TRANSCRIPTION ENCOUNTER (OUTPATIENT)
Age: 71
End: 2021-07-20

## 2021-07-20 ENCOUNTER — OUTPATIENT (OUTPATIENT)
Dept: OUTPATIENT SERVICES | Facility: HOSPITAL | Age: 71
LOS: 1 days | End: 2021-07-20
Payer: MEDICARE

## 2021-07-20 DIAGNOSIS — N60.09 SOLITARY CYST OF UNSPECIFIED BREAST: Chronic | ICD-10-CM

## 2021-07-20 DIAGNOSIS — Z98.51 TUBAL LIGATION STATUS: Chronic | ICD-10-CM

## 2021-07-20 DIAGNOSIS — Z00.8 ENCOUNTER FOR OTHER GENERAL EXAMINATION: ICD-10-CM

## 2021-07-20 PROCEDURE — C1739: CPT

## 2021-07-20 PROCEDURE — 19281 PERQ DEVICE BREAST 1ST IMAG: CPT

## 2021-07-20 PROCEDURE — 19282 PERQ DEVICE BREAST EA IMAG: CPT | Mod: RT

## 2021-07-20 PROCEDURE — 19281 PERQ DEVICE BREAST 1ST IMAG: CPT | Mod: RT

## 2021-07-20 PROCEDURE — 19282 PERQ DEVICE BREAST EA IMAG: CPT

## 2021-07-21 ENCOUNTER — APPOINTMENT (OUTPATIENT)
Dept: MAMMOGRAPHY | Facility: IMAGING CENTER | Age: 71
End: 2021-07-21

## 2021-07-21 ENCOUNTER — APPOINTMENT (OUTPATIENT)
Dept: NUCLEAR MEDICINE | Facility: IMAGING CENTER | Age: 71
End: 2021-07-21

## 2021-07-21 ENCOUNTER — OUTPATIENT (OUTPATIENT)
Dept: OUTPATIENT SERVICES | Facility: HOSPITAL | Age: 71
LOS: 1 days | End: 2021-07-21
Payer: COMMERCIAL

## 2021-07-21 ENCOUNTER — OUTPATIENT (OUTPATIENT)
Dept: OUTPATIENT SERVICES | Facility: HOSPITAL | Age: 71
LOS: 1 days | Discharge: HOME | End: 2021-07-21
Payer: MEDICARE

## 2021-07-21 ENCOUNTER — RESULT REVIEW (OUTPATIENT)
Age: 71
End: 2021-07-21

## 2021-07-21 ENCOUNTER — APPOINTMENT (OUTPATIENT)
Dept: NUCLEAR MEDICINE | Facility: HOSPITAL | Age: 71
End: 2021-07-21

## 2021-07-21 VITALS
HEIGHT: 60 IN | DIASTOLIC BLOOD PRESSURE: 62 MMHG | WEIGHT: 134.92 LBS | HEART RATE: 73 BPM | OXYGEN SATURATION: 98 % | SYSTOLIC BLOOD PRESSURE: 149 MMHG | TEMPERATURE: 98 F | RESPIRATION RATE: 18 BRPM

## 2021-07-21 VITALS
OXYGEN SATURATION: 100 % | SYSTOLIC BLOOD PRESSURE: 139 MMHG | RESPIRATION RATE: 16 BRPM | TEMPERATURE: 97 F | DIASTOLIC BLOOD PRESSURE: 66 MMHG | HEART RATE: 76 BPM

## 2021-07-21 DIAGNOSIS — Z90.722 ACQUIRED ABSENCE OF OVARIES, BILATERAL: Chronic | ICD-10-CM

## 2021-07-21 DIAGNOSIS — N60.09 SOLITARY CYST OF UNSPECIFIED BREAST: Chronic | ICD-10-CM

## 2021-07-21 DIAGNOSIS — C50.811 MALIGNANT NEOPLASM OF OVERLAPPING SITES OF RIGHT FEMALE BREAST: ICD-10-CM

## 2021-07-21 DIAGNOSIS — Z98.51 TUBAL LIGATION STATUS: Chronic | ICD-10-CM

## 2021-07-21 DIAGNOSIS — Z00.8 ENCOUNTER FOR OTHER GENERAL EXAMINATION: ICD-10-CM

## 2021-07-21 LAB
BLD GP AB SCN SERPL QL: NEGATIVE — SIGNIFICANT CHANGE UP
GLUCOSE BLDC GLUCOMTR-MCNC: 124 MG/DL — HIGH (ref 70–99)
RH IG SCN BLD-IMP: POSITIVE — SIGNIFICANT CHANGE UP

## 2021-07-21 PROCEDURE — 76098 X-RAY EXAM SURGICAL SPECIMEN: CPT | Mod: 26

## 2021-07-21 PROCEDURE — 88307 TISSUE EXAM BY PATHOLOGIST: CPT | Mod: 26

## 2021-07-21 PROCEDURE — 88331 PATH CONSLTJ SURG 1 BLK 1SPC: CPT | Mod: 26

## 2021-07-21 PROCEDURE — A9541: CPT

## 2021-07-21 RX ORDER — OMEGA-3 ACID ETHYL ESTERS 1 G
1 CAPSULE ORAL
Qty: 0 | Refills: 0 | DISCHARGE

## 2021-07-21 RX ORDER — HYDROMORPHONE HYDROCHLORIDE 2 MG/ML
1 INJECTION INTRAMUSCULAR; INTRAVENOUS; SUBCUTANEOUS
Refills: 0 | Status: DISCONTINUED | OUTPATIENT
Start: 2021-07-21 | End: 2021-07-21

## 2021-07-21 RX ORDER — METFORMIN HYDROCHLORIDE 850 MG/1
1 TABLET ORAL
Qty: 0 | Refills: 0 | DISCHARGE

## 2021-07-21 RX ORDER — HYDROMORPHONE HYDROCHLORIDE 2 MG/ML
0.5 INJECTION INTRAMUSCULAR; INTRAVENOUS; SUBCUTANEOUS
Refills: 0 | Status: DISCONTINUED | OUTPATIENT
Start: 2021-07-21 | End: 2021-07-21

## 2021-07-21 RX ORDER — UBIDECARENONE 100 MG
1 CAPSULE ORAL
Qty: 0 | Refills: 0 | DISCHARGE

## 2021-07-21 RX ORDER — ONDANSETRON 8 MG/1
4 TABLET, FILM COATED ORAL ONCE
Refills: 0 | Status: DISCONTINUED | OUTPATIENT
Start: 2021-07-21 | End: 2021-08-05

## 2021-07-21 RX ORDER — OXYCODONE HYDROCHLORIDE 5 MG/1
10 TABLET ORAL ONCE
Refills: 0 | Status: DISCONTINUED | OUTPATIENT
Start: 2021-07-21 | End: 2021-07-21

## 2021-07-21 RX ORDER — OXYCODONE HYDROCHLORIDE 5 MG/1
5 TABLET ORAL ONCE
Refills: 0 | Status: DISCONTINUED | OUTPATIENT
Start: 2021-07-21 | End: 2021-07-21

## 2021-07-21 RX ORDER — SODIUM CHLORIDE 9 MG/ML
1000 INJECTION, SOLUTION INTRAVENOUS
Refills: 0 | Status: DISCONTINUED | OUTPATIENT
Start: 2021-07-21 | End: 2021-08-05

## 2021-07-21 RX ADMIN — SODIUM CHLORIDE 75 MILLILITER(S): 9 INJECTION, SOLUTION INTRAVENOUS at 17:25

## 2021-07-21 RX ADMIN — HYDROMORPHONE HYDROCHLORIDE 0.5 MILLIGRAM(S): 2 INJECTION INTRAMUSCULAR; INTRAVENOUS; SUBCUTANEOUS at 17:20

## 2021-07-21 RX ADMIN — HYDROMORPHONE HYDROCHLORIDE 0.5 MILLIGRAM(S): 2 INJECTION INTRAMUSCULAR; INTRAVENOUS; SUBCUTANEOUS at 17:50

## 2021-07-21 NOTE — ASU PREOP CHECKLIST - SELECT TESTS ORDERED
HGBA1C,fs=/BMP/CBC/Type and Cross/Type and Screen/POCT Blood Glucose HGBA1C,fs=/BMP/CBC/EKG/POCT Blood Glucose

## 2021-07-21 NOTE — ASU DISCHARGE PLAN (ADULT/PEDIATRIC) - ASU DC SPECIAL INSTRUCTIONSFT
Please follow up with Dr. Chanel in 1 week for drain removal. Please follow up with Dr. Chanel in 1 week for drain removal. Wear bra for support as needed.

## 2021-07-21 NOTE — ASU PATIENT PROFILE, ADULT - PMH
Diabetes  type 2  H/O gastroesophageal reflux (GERD)    Hyperlipidemia    Malignant neoplasm  breast  Thrombocytopenia

## 2021-07-21 NOTE — ASU DISCHARGE PLAN (ADULT/PEDIATRIC) - CALL YOUR DOCTOR IF YOU HAVE ANY OF THE FOLLOWING:
Bleeding that does not stop/Swelling that gets worse/Pain not relieved by Medications/Fever greater than (need to indicate Fahrenheit or Celsius)/Wound/Surgical Site with redness, or foul smelling discharge or pus/Numbness, tingling, color or temperature change to extremity/Nausea and vomiting that does not stop Bleeding that does not stop/Swelling that gets worse/Pain not relieved by Medications/Fever greater than (need to indicate Fahrenheit or Celsius)/Wound/Surgical Site with redness, or foul smelling discharge or pus/Numbness, tingling, color or temperature change to extremity/Nausea and vomiting that does not stop/Unable to urinate/Excessive diarrhea/Inability to tolerate liquids or foods/Increased irritability or sluggishness

## 2021-07-21 NOTE — BRIEF OPERATIVE NOTE - OPERATION/FINDINGS
Local injected, breast localization conducted using val , breast tissue removed, confirmed by imaging. Closed in layers, dressed with steri strips and allevyn. KHALIF drain placed.

## 2021-07-21 NOTE — ASU DISCHARGE PLAN (ADULT/PEDIATRIC) - CARE PROVIDER_API CALL
Lizette Chanel  SURGERY  [address] [place] [address]  Phone: [phone]  Fax: [phone]  Follow Up Time:

## 2021-07-21 NOTE — ASU DISCHARGE PLAN (ADULT/PEDIATRIC) - FOLLOW UP APPOINTMENTS
[facility] may also call Recovery Room (PACU) 24/7 @ [phone]/[facility]

## 2021-07-21 NOTE — ASU PATIENT PROFILE, ADULT - PSH
Breast cyst  removed bilateral 2019   delivery delivered  1977  H/O bilateral oophorectomy    H/O tubal ligation

## 2021-07-21 NOTE — ASU DISCHARGE PLAN (ADULT/PEDIATRIC) - NURSING INSTRUCTIONS
You received IV Tylenol for pain management at 3:53PM. Please DO NOT take any Tylenol (Acetaminophen) containing products, such as Vicodin, Percocet, Excedrin, and cold medications for the next 6 hours (until 9:53 PM). DO NOT TAKE MORE THAN 3000 MG OF TYLENOL in a 24 hour period.

## 2021-07-21 NOTE — ASU PATIENT PROFILE, ADULT - IS PATIENT PREGNANT?
-Prominent endometrial echo complex measuring 1.5 cm  -Discuss with patient and advise her to see GYN for further evaluation   - Continued faint hyperdensity along the fundal portion of the gallbladder, likely represent adenomyomatosis  -Consider outpatient workup for gallstones with RUQ U/S, patient currently not c/o symptoms of biliary colic no

## 2021-07-27 LAB — SURGICAL PATHOLOGY STUDY: SIGNIFICANT CHANGE UP

## 2021-11-18 ENCOUNTER — LABORATORY RESULT (OUTPATIENT)
Age: 71
End: 2021-11-18

## 2021-11-19 ENCOUNTER — APPOINTMENT (OUTPATIENT)
Dept: OBGYN | Facility: CLINIC | Age: 71
End: 2021-11-19
Payer: MEDICARE

## 2021-11-19 VITALS — SYSTOLIC BLOOD PRESSURE: 154 MMHG | BODY MASS INDEX: 24.75 KG/M2 | DIASTOLIC BLOOD PRESSURE: 92 MMHG | WEIGHT: 131 LBS

## 2021-11-19 DIAGNOSIS — Z01.419 ENCOUNTER FOR GYNECOLOGICAL EXAMINATION (GENERAL) (ROUTINE) W/OUT ABNORMAL FINDINGS: ICD-10-CM

## 2021-11-19 DIAGNOSIS — M81.0 AGE-RELATED OSTEOPOROSIS W/OUT CURRENT PATHOLOGICAL FRACTURE: ICD-10-CM

## 2021-11-19 DIAGNOSIS — N76.2 ACUTE VULVITIS: ICD-10-CM

## 2021-11-19 PROCEDURE — 99212 OFFICE O/P EST SF 10 MIN: CPT | Mod: 25

## 2021-11-19 PROCEDURE — 99397 PER PM REEVAL EST PAT 65+ YR: CPT

## 2021-11-19 RX ORDER — ANASTROZOLE TABLETS 1 MG/1
1 TABLET ORAL
Qty: 90 | Refills: 0 | Status: ACTIVE | COMMUNITY
Start: 2021-10-19

## 2021-11-19 RX ORDER — PRAVASTATIN SODIUM 40 MG/1
40 TABLET ORAL
Qty: 90 | Refills: 0 | Status: ACTIVE | COMMUNITY
Start: 2021-10-29

## 2021-11-19 RX ORDER — ALENDRONATE SODIUM 70 MG/1
70 TABLET ORAL
Qty: 12 | Refills: 0 | Status: ACTIVE | COMMUNITY
Start: 2021-10-29

## 2021-11-19 RX ORDER — MULTIVITAMIN/IRON/FOLIC ACID 18MG-0.4MG
600-400 TABLET ORAL
Qty: 60 | Refills: 10 | Status: ACTIVE | COMMUNITY
Start: 2021-11-19 | End: 1900-01-01

## 2021-11-19 RX ORDER — CLOTRIMAZOLE AND BETAMETHASONE DIPROPIONATE 10; .5 MG/G; MG/G
1-0.05 CREAM TOPICAL TWICE DAILY
Qty: 1 | Refills: 2 | Status: ACTIVE | COMMUNITY
Start: 2021-11-19 | End: 1900-01-01

## 2021-11-19 RX ORDER — OXYCODONE AND ACETAMINOPHEN 5; 325 MG/1; MG/1
5-325 TABLET ORAL
Qty: 12 | Refills: 0 | Status: ACTIVE | COMMUNITY
Start: 2021-07-21

## 2022-02-22 NOTE — ASU DISCHARGE PLAN (ADULT/PEDIATRIC) - ASU DISCHARGE DATE/TIME

## 2022-05-13 ENCOUNTER — APPOINTMENT (OUTPATIENT)
Dept: OBGYN | Facility: CLINIC | Age: 72
End: 2022-05-13

## 2023-10-01 PROBLEM — M54.16 LUMBAR RADICULAR PAIN: Status: ACTIVE | Noted: 2019-04-17

## 2024-11-13 NOTE — H&P PST ADULT - NEGATIVE FEMALE-SPECIFIC SYMPTOMS
Addended by: EVERARDO CASE on: 11/13/2024 09:34 AM     Modules accepted: Orders     no spotting/no abnormal vaginal bleeding/no pelvic pain

## 2025-03-06 NOTE — H&P PST ADULT - NEGATIVE NEUROLOGICAL SYMPTOMS
REASON FOR FOLLOW-UP: DCIS of left breast.      History of Present Illness    The patient is a 78 y.o. female with the above-mentioned history, who returns to the office today for 6-month follow-up and review.  She continues on anastrozole 1 mg daily with overall excellent tolerance.  She notes her joint pain is actually somewhat improved.  She reports very occasional hot flashes though they are manageable.  She underwent DEXA scan last fall which thankfully noted normal bone density.  She continues to undergo annual screening mammogram.    ONCOLOGY HISTORY:  The patient 77-year-old female followed by primary care with the below medical disorders including hypertension, hyperlipidemia, diabetes mellitus and GE reflux as well as rotator cuff tear 3/18/2022 status post steroid injection therapy.  She also underwent EGD 6/8/2022 with normal findings, antral gastritis noted and several small diminutive gastric polyps.    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  ER 99%, AZ 99%.    The patient was seen by Dr. Juarez of general surgery in then proceeded with [facility] 9 panel genetic testing, subsequent MRI 7/2/2022 demonstrating her biopsy-proven site of DCIS up to 1.3 cm, nonspecific enhancement surrounding the cavity and no other findings that might suggest further malignancy including the right breast.    The patient 8/18/2022 underwent a left breast localized lumpectomy with pathology revealing left breast now without additional DCIS or LCIS or ALH   determined- PTisNxMx.    The patient was seen 9/12/2022  for radiation therapy consultation with a [place] vacation planned in approximately 3 weeks and the patient to return just after her trip in mid October 2022 with plans to proceed with [phone] in 15 fractions.    She is now seen in office 9/14/2022 for consideration of adjuvant therapy.    Additional historical point includes uterine abnormalities noted 7/16/2021 on ultrasound pelvis and transvaginal ultrasound leading to endometrial biopsy that was negative.    Patient is next seen 10/28/2022 with bone density having been performed 10/17 demonstrating lumbar T score of 2.9, left hip T score of 1.0 and right hip T score of 0.5-normal bone density.    She is feeling well though has reviewed the possibility of radiation therapy with she and her  decided not to proceed with it.  As result we discussed moving to endocrine therapy choosing between that which would not confuse the findings of her endometrial thickening.  As result of her normal bone density this will be an aromatase inhibitor such as Arimidex.  At length she and her  are willing to proceed with a trial of this at least over the next month.  This therapy, however, will be offered over 5-year length.    The patient seen back in follow-up 12/8/2022.  She is tolerating Arimidex without complication though is having further issues with her right hand and trigger finger that has been present for many years.    The patient was referred to hand surgery and underwent injection 12/30/2022.  This was successful and her hand pain and immobility has improved.  We have, additionally, discussed Dupuytren contracture of which the findings physically are suggestive.  Otherwise she is not having any issues tolerating Arimidex and we plan to continue same.    The patient is next seen 9/15/2023 doing reasonably well without any additional issues.  She was able to have her Dupuytren contraction treated successfully and is not having joint discomfort from the  use of AI therapy.    She returns 3/29/24 for follow up continuing on Arimidex daily.  She has upcoming mammogram in April scheduled and will be due for repeat DEXA scan in the fall.  She tolerates the arimidex without noted difficulty.     The patient is next evaluated 9/18/2024.  Is tolerating Arimidex with few musculoskeletal symptoms other than her baseline.  Notably she is to follow-up with hand surgery in the near future considering her Dupuytren's contractures.  Mammography obtained 1 May 24 showed no evidence of malignancy.    Past Medical History:   Diagnosis Date    Arthritis     Asthma     Breast cancer     Breast cancer, left     Chronic cough     Diabetes mellitus     Fibrocystic breast     GERD (gastroesophageal reflux disease)     Goiter     INWARD GOITER    Hearing loss     left    History of colon polyps     History of skin cancer     Hyperlipidemia     Hypertension     OFF MEDS X  4-5 YEARS    Lung nodule     FOLLOWED BY DR RAMÍREZ - WATCHING WITH REGULAR CT SCANS    Overactive bladder     Seasonal allergies     Vertigo         Past Surgical History:   Procedure Laterality Date    BREAST CYST EXCISION Left 2002    BREAST LUMPECTOMY Left 8/18/2022    Procedure: left breast wire localized lumpectomy;  Surgeon: Sarah Juarez MD;  Location: [facility] OR [facility] [place];  Service: General;  Laterality: Left;    CATARACT EXTRACTION EXTRACAPSULAR W/ INTRAOCULAR LENS IMPLANTATION      COLONOSCOPY N/A 03/06/2017    TA polyps, IH    COLONOSCOPY N/A 02/27/2019    Procedure: COLONOSCOPY TO CECUM WITH COLD BIOPSIES;  Surgeon: Marquez Alan MD;  Location: [facility] ENDOSCOPY;  Service: Gastroenterology    COLONOSCOPY N/A 5/8/2024    Procedure: COLONOSCOPY to cecum and TI with saline injection and hot snare / cold snare polypectomies;  Surgeon: Marquez Alan MD;  Location: [facility] ENDOSCOPY;  Service: Gastroenterology;  Laterality: N/A;  Pre - h/o colon polyps.  Post - polyps    D & C HYSTEROSCOPY N/A 10/14/2020     Procedure: DILATATION AND CURETTAGE HYSTEROSCOPY WITH MYOSURE;  Surgeon: Mariano Calix MD;  Location: [facility] OR [facility] [place];  Service: Obstetrics/Gynecology;  Laterality: N/A;    DENTAL PROCEDURE      DILATATION AND CURETTAGE      MULTIPLE D/T MISCARRIAGES    ENDOSCOPY N/A 06/08/2022    Procedure: ESOPHAGOGASTRODUODENOSCOPY with biopsy;  Surgeon: Marquez Alan MD;  Location: [facility] ENDOSCOPY;  Service: Gastroenterology;  Laterality: N/A;  gastritis    EYE SURGERY  3/4/2015    ORIF ANKLE FRACTURE Left     WITH INSTRUMENTATION    SKIN CANCER EXCISION Right     LEG        Current Outpatient Medications on File Prior to Visit   Medication Sig Dispense Refill    albuterol sulfate  (90 Base) MCG/ACT inhaler Inhale 2 puffs Every 4 (Four) Hours As Needed for Wheezing.      aspirin 81 MG EC tablet Take 1 tablet by mouth Daily.      Breztri Aerosphere 160-9-4.8 MCG/ACT aerosol inhaler 2 puffs.      fexofenadine-pseudoephedrine (ALLEGRA-D 24) 180-240 MG per 24 hr tablet Take 1 tablet by mouth As Needed for Allergies.      glipiZIDE (GLUCOTROL) 5 MG ER tablet Take 1 tablet by mouth Daily.      metFORMIN (GLUCOPHAGE) 500 MG tablet Take 1 tablet by mouth every night at bedtime.      montelukast (SINGULAIR) 10 MG tablet Take 1 tablet by mouth Every Night.      nortriptyline (PAMELOR) 10 MG capsule Take 1 capsule by mouth At Night As Needed (Diarrhea). 90 capsule 3    simvastatin (ZOCOR) 10 MG tablet Take 1 tablet by mouth Every Night.      fluticasone (FLONASE) 50 MCG/ACT nasal spray Administer 2 sprays into the nostril(s) as directed by provider As Needed for Rhinitis. (Patient not taking: Reported on 3/5/2025)       No current facility-administered medications on file prior to visit.        ALLERGIES:    Allergies   Allergen Reactions    Sulfa Antibiotics Other (See Comments)     CHILDHOOD REACTION         Social History     Socioeconomic History    Marital status:      Spouse name: Kory   Tobacco Use    Smoking  "status: Never    Smokeless tobacco: Never   Vaping Use    Vaping status: Never Used   Substance and Sexual Activity    Alcohol use: Not Currently     Comment: RARELY    Drug use: Never    Sexual activity: Yes     Partners: Male     Birth control/protection: Post-menopausal, None        Family History   Problem Relation Age of Onset    GI problems Mother         ileostomy    Dementia Mother     Pancreatic cancer Father     Arthritis Father     Diabetes Father     Malig Hyperthermia Neg Hx     Breast cancer Neg Hx     Ovarian cancer Neg Hx         Review of Systems   ROS as per HPI    Objective     Vitals:    03/05/25 1307   BP: 154/80   Pulse: 90   Temp: 98.1 °F (36.7 °C)   TempSrc: Oral   SpO2: 97%   Weight: 76.2 kg (168 lb)   Height: 157.5 cm (62.01\")   PainSc: 0-No pain             3/5/2025     1:04 PM   Current Status   ECOG score 0       Physical Exam  Constitutional:       Appearance: Normal appearance.   HENT:      Head: Normocephalic and atraumatic.      Nose: Nose normal.   Eyes:      Extraocular Movements: Extraocular movements intact.      Conjunctiva/sclera: Conjunctivae normal.      Pupils: Pupils are equal, round, and reactive to light.   Cardiovascular:      Rate and Rhythm: Normal rate and regular rhythm.      Pulses: Normal pulses.      Heart sounds: Normal heart sounds.   Pulmonary:      Effort: Pulmonary effort is normal.      Breath sounds: Normal breath sounds.      Comments: Status post left breast lumpectomy, no nodules noted, no skin changes. no additional axillary adenopathy.  Abdominal:      General: Bowel sounds are normal.      Palpations: Abdomen is soft.   Musculoskeletal:         General: Deformity (Mild contracture felt just proximal to right fourth finger anteriorly) present. Normal range of motion.      Cervical back: Normal range of motion and neck supple.      Right lower leg: No edema.      Left lower leg: No edema.   Skin:     General: Skin is warm and dry.      Comments: Many " large nevi on the neck chest and breasts. On the right breast at the 2:00 position 2.5-3cm raised nevi.   Neurological:      General: No focal deficit present.      Mental Status: She is alert and oriented to person, place, and time.   Psychiatric:         Mood and Affect: Mood normal.         Behavior: Behavior normal.           RECENT LABS:  Results from last 7 days   Lab Units 03/05/25  1247   WBC 10*3/mm3 7.71   NEUTROS ABS 10*3/mm3 4.42   HEMOGLOBIN g/dL 13.1   HEMATOCRIT % 39.4   PLATELETS 10*3/mm3 183             IMAGING:  DEXA Bone Density Axial (10/21/2024 11:10)     Assessment & Plan        77-year-old female with history of hypertension, hyperlipidemia, diabetes, GE reflux, rotator cuff injury as well as additional history of recent EGD with benign findings and the previous endometrial hyperplasia (please see record).      The patient underwent screening mammography/12/22 with microcalcifications seen in the anterior one third of the retroareolar region left breast and stereotactic biopsy 6/13 showed DCIS of high nuclear grade strongly ER/UT positive.  She was seen by general surgery-Dr. Juarez-with MRI demonstrating the biopsy-proven site of DCIS and no other findings that might suggest malignancy in the left breast or right breast.  She underwent lumpectomy 8/18/2022 without residual tumor determined-stage - PTisNxMx.       The patient been seen by radiation therapy and is considering as to whether she will undergo this therapy.        We are asked to see her for adjuvant therapy concerns and have discussed endocrine therapy (tamoxifen versus AI therapy) that could reduce considerably her risk of invasive or noninvasive breast cancer.       After additional discussion particularly centering on her previous history including her endometrial hyperplasia it would seem prudent to have her undergo a bone density to determine her bone density status before we make a decision about adjuvant endocrine therapy  of which she is considering as a total treatment rather than additional radiation therapy at this point.    Patient is next seen 10/28/2022 with bone density having been performed 10/17 demonstrating lumbar T score of 2.9, left hip T score of 1.0 and right hip T score of 0.5-normal bone density.    The patient and her  are advised of the normal bone density findings and have also decided, after reviewing it between themselves, not to proceed with radiation therapy.  This leads to conversation about the use of aromatase number therapy as primary treatment since tamoxifen may actually produce further endometrial thickening and continues her subsequent assessments of this issue.    The patient is next seen 12/8/2022 tolerating Arimidex well without substantial side effects.  We will plan to continue for an additional 3-month trial.    The patient was referred to hand surgery and underwent injection 12/30/2022.  This was successful and her hand pain and immobility has improved.  We have, additionally, discussed Dupuytren contracture of which the findings physically are suggestive.  Otherwise she is not having any issues tolerating Arimidex and we plan to continue same.    The patient is next seen 9/15/2023 doing reasonably well without any additional issues.  She was able to have her Dupuytren contraction treated successfully and is not having joint discomfort from the use of AI therapy.    Patient returns 3/29/2024 in follow-up continuing on Arimidex daily.  He continues to tolerate this well.  She has been scheduled for her upcoming mammogram by her GYN in April.  She will be due for repeat DEXA scan in the fall.      The patient is next evaluated 9/18/2024.  Is tolerating Arimidex with few musculoskeletal symptoms other than her baseline.  Notably she is to follow-up with hand surgery in the near future considering her Dupuytren's contractures.  Mammography obtained 1 May 24 showed no evidence of  malignancy.    3/5/2025 patient reviewed continue with excellent tolerance to Arimidex.  DEXA scan 10/21/2024 reviewed with normal bone density.  Clinically no evidence of palpable abnormality in breast bilaterally.  Patient was encouraged to contact dermatology of multiple nevi change or enlarge    Plan:  Continue anastrozole 1 mg daily initiated November 2022  Normal bone density reviewed DEXA 10/21/2024.  Repeat DEXA scan in 2026  Annual mammogram due April 2025, this was ordered today  6 month follow-up with Dr. Rasmussen with CBC, CMP and review of mammogram    The patient continues on high risk medication with aromatase inhibitor.    Kayy Harrington, APRN  03/05/2025            no headache/no weakness/no vertigo